# Patient Record
Sex: MALE | Race: WHITE
[De-identification: names, ages, dates, MRNs, and addresses within clinical notes are randomized per-mention and may not be internally consistent; named-entity substitution may affect disease eponyms.]

---

## 2020-04-27 ENCOUNTER — HOSPITAL ENCOUNTER (EMERGENCY)
Dept: HOSPITAL 7 - FB.ED | Age: 20
Discharge: HOME | End: 2020-04-27
Payer: MEDICAID
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
## 2020-04-27 DIAGNOSIS — S30.0XXA: Primary | ICD-10-CM

## 2020-04-27 PROCEDURE — 99283 EMERGENCY DEPT VISIT LOW MDM: CPT

## 2020-04-27 PROCEDURE — 72100 X-RAY EXAM L-S SPINE 2/3 VWS: CPT

## 2020-04-27 NOTE — EDM.PDOC
ED HPI GENERAL MEDICAL PROBLEM





- General


Chief Complaint: Back Pain or Injury


Stated Complaint: SEVERE BACK PAIN


Time Seen by Provider: 04/27/20 16:50


Source of Information: Reports: Patient


History Limitations: Reports: No Limitations





- History of Present Illness


INITIAL COMMENTS - FREE TEXT/NARRATIVE: 





Patient's dog tripped him while walking down stairs on 4/25/20, he landed on 

his lower back. Complains of persistent low back pain, no improvement with 

Ibuprofen. Denies radiation of the pain, numbness, tingling, weakness, or 

hematuria.


Onset Date: 04/25/20


Location: Reports: Back


Severity: Moderate


Treatments PTA: Reports: NSAIDS





- Related Data


 Allergies











Allergy/AdvReac Type Severity Reaction Status Date / Time


 


No Known Allergies Allergy   Verified 04/27/20 17:04











Home Meds: 


 Home Meds





Baclofen 10 mg PO Q8H PRN #20 tablet 04/27/20 [Rx]


traMADol [Ultram] 50 - 100 mg PO Q8H PRN #20 tab 04/27/20 [Rx]











Past Medical History





- Past Health History


Medical/Surgical History: Denies Medical/Surgical History





ED ROS GENERAL





- Review of Systems


Review Of Systems: Comprehensive ROS is negative, except as noted in HPI.





ED EXAM,LOWER BACK PAIN/INJURY





- Physical Exam


Exam: See Below


Exam Limited By: No Limitations


General Appearance: Alert, WD/WN, No Apparent Distress


Eye Exam: Bilateral Eye: EOMI, PERRL


Nose: Normal Inspection


Throat/Mouth: No Airway Compromise


Head: Atraumatic, Normocephalic


Neck: Full Range of Motion


Respiratory/Chest: No Respiratory Distress


GI/Abdominal: Soft, Non-Tender, No Distention


Back Exam: Full Range of Motion


Extremities: Normal Range of Motion


Neurological: Alert, Normal Mood/Affect, No Motor/Sensory Deficits, Oriented x 3


Psychiatric: Normal Affect, Normal Mood


Skin Exam: Warm, Dry, Intact





Course





- Vital Signs


Last Recorded V/S: 


 Last Vital Signs











Temp  36.6 C   04/27/20 16:38


 


Pulse  62   04/27/20 16:38


 


Resp  16   04/27/20 16:38


 


BP  127/76   04/27/20 16:38


 


Pulse Ox  96   04/27/20 16:38














- Orders/Labs/Meds


Orders: 


 Active Orders 24 hr











 Category Date Time Status


 


 Lumbar Spine 2 or 3V [CR] Stat Exams  04/27/20 16:49 Taken











Meds: 


Medications














Discontinued Medications














Generic Name Dose Route Start Last Admin





  Trade Name Freq  PRN Reason Stop Dose Admin


 


Baclofen  10 mg  04/27/20 17:46  





  Lioresal  PO  04/27/20 17:47  





  ONETIME ONE   





     





     





     





     


 


Tramadol HCl  100 mg  04/27/20 17:46  





  Ultram  PO  04/27/20 17:47  





  ONETIME ONE   





     





     





     





     














- Radiology Interpretation


Free Text/Narrative:: 





L-spine xray: No osseous abnormalities. (ED provider interpretation)





Departure





- Departure


Time of Disposition: 17:54


Disposition: Home, Self-Care 01


Condition: Good


Clinical Impression: 


Contusion of lower back


Qualifiers:


 Encounter type: initial encounter Qualified Code(s): S30.0XXA - Contusion of 

lower back and pelvis, initial encounter








- Discharge Information


*PRESCRIPTION DRUG MONITORING PROGRAM REVIEWED*: Yes


*COPY OF PRESCRIPTION DRUG MONITORING REPORT IN PATIENT GERARDO: Not Applicable


Prescriptions: 


Baclofen 10 mg PO Q8H PRN #20 tablet


 PRN Reason: Muscle Spasm


traMADol [Ultram] 50 - 100 mg PO Q8H PRN #20 tab


 PRN Reason: Pain


Referrals: 


Kristen Romero NP [Primary Care Provider] - 


Forms:  ED Department Discharge


Additional Instructions: 


Fill the prescriptions for Tramadol and Baclofen and take as directed. Rest. 

Follow up with your primary physician in 3 days if symptoms don't improve.





Sepsis Event Note





- Focused Exam


Vital Signs: 


 Vital Signs











  Temp Pulse Resp BP Pulse Ox


 


 04/27/20 16:38  36.6 C  62  16  127/76  96











Date Exam was Performed: 04/27/20


Time Exam was Performed: 17:54





- My Orders


Last 24 Hours: 


My Active Orders





04/27/20 16:49


Lumbar Spine 2 or 3V [CR] Stat 














- Assessment/Plan


Last 24 Hours: 


My Active Orders





04/27/20 16:49


Lumbar Spine 2 or 3V [CR] Stat

## 2021-03-10 NOTE — EDM.PDOC
ED HPI GENERAL MEDICAL PROBLEM





- General


Stated Complaint: R HAND,THUMB INJURY


Time Seen by Provider: 03/10/21 20:55


Source of Information: Reports: Patient


History Limitations: Reports: No Limitations





- History of Present Illness


INITIAL COMMENTS - FREE TEXT/NARRATIVE: 





Patient presented to the ED because of right thumb injury. He is able to extend 

and flex his finger without any difficulty.





- Related Data


                                    Allergies











Allergy/AdvReac Type Severity Reaction Status Date / Time


 


No Known Allergies Allergy   Verified 04/27/20 17:04











Home Meds: 


                                    Home Meds





Baclofen 10 mg PO Q8H PRN #20 tablet 04/27/20 [Rx]


traMADol [Ultram] 50 - 100 mg PO Q8H PRN #20 tab 04/27/20 [Rx]











Past Medical History





- Past Health History


Medical/Surgical History: Denies Medical/Surgical History


HEENT History: Reports: None


Cardiovascular History: Reports: None


Respiratory History: Reports: None


Gastrointestinal History: Reports: None





- Past Surgical History


HEENT Surgical History: Reports: None


Cardiovascular Surgical History: Reports: None


GI Surgical History: Reports: None


Endocrine Surgical History: Reports: None


Musculoskeletal Surgical History: Reports: Other (See Below)


Other Musculoskeletal Surgeries/Procedures:: elbow surgery





Social & Family History





- Family History


Family Medical History: No Pertinent Family History





- Caffeine Use


Caffeine Use: Reports: Soda





ED ROS GENERAL





- Review of Systems


Review Of Systems: See Below


Constitutional: Reports: No Symptoms


HEENT: Reports: No Symptoms


Respiratory: Reports: No Symptoms


Cardiovascular: Reports: No Symptoms


Endocrine: Reports: No Symptoms


GI/Abdominal: Reports: No Symptoms


: Reports: No Symptoms


Musculoskeletal: Reports: No Symptoms


Skin: Reports: No Symptoms


Neurological: Reports: No Symptoms





ED EXAM, GENERAL





- Physical Exam


Exam: See Below


Exam Limited By: No Limitations


General Appearance: Alert, No Apparent Distress


Ears: Normal External Exam, Normal Canal


Nose: Normal Inspection, Normal Mucosa, No Blood


Throat/Mouth: Normal Inspection, Normal Lips, Normal Teeth, Normal Gums


Head: Atraumatic, Normocephalic


Neck: Normal Inspection, Supple, Non-Tender, Full Range of Motion


Respiratory/Chest: No Respiratory Distress, Lungs Clear, Normal Breath Sounds


Cardiovascular: Normal Peripheral Pulses, Regular Rate, Rhythm, No Edema


GI/Abdominal: Normal Bowel Sounds, Soft, Non-Tender, No Organomegaly


Back Exam: Normal Inspection, Full Range of Motion


Extremities: Normal Inspection, Normal Range of Motion, Non-Tender


Neurological: Alert, Oriented, CN II-XII Intact





Course





- Vital Signs


Text/Narrative:: 





Reassurance





Departure





- Departure


Time of Disposition: 21:20


Disposition: Home, Self-Care 01


Condition: Good


Clinical Impression: 


 Thumbnail injury








- Discharge Information


Referrals: 


Kristen Romero NP [Primary Care Provider] - 


Additional Instructions: 


Please read discharge instructions on thumb injury


Soak your thum in ice water,it helps with pain and swelling


You can take ibuprofen 800 mg with tylenol 1000 mg every 8 hours as needed for 

pain/swelling


Follow up as needed

## 2021-10-10 NOTE — ER
DATE SEEN:  10/10/2021

 

REASON FOR VISIT:  Tooth pain.

 

HISTORY OF PRESENT ILLNESS:  This is a 21-year-old male with pain around the

left cheek for 1-1/2 days making it hard to eat.  He has no fever or chills.  It

is associated with swelling below the left eye.

 

PHYSICAL EXAMINATION:  GENERAL:  He is not in distress and he is afebrile.

HEENT:  Head is atraumatic.  On examination of his face, there is tenderness and

swelling around the left zygoma and maxillary area.  Oral exam confirmed a

dental cavity in the left upper molar.

 

IMPRESSION:  Dental abscess.

 

TREATMENT:  Penicillin 500 mg 3 times a day and Tylenol No. 3 one tablet 3 times

a day.  He has an appointment early next week with a dentist.

 

Job#: [id number]

DD: 10/10/2021 0223

DT: 10/10/2021 0255 [place]/MESFIN